# Patient Record
Sex: MALE | Race: WHITE | RURAL
[De-identification: names, ages, dates, MRNs, and addresses within clinical notes are randomized per-mention and may not be internally consistent; named-entity substitution may affect disease eponyms.]

---

## 2017-08-03 ENCOUNTER — HOSPITAL ENCOUNTER (OUTPATIENT)
Dept: OTHER | Age: 37
Discharge: HOME | End: 2017-08-03

## 2017-08-06 ENCOUNTER — OFFICE VISIT (OUTPATIENT)
Dept: RETAIL CLINIC | Facility: CLINIC | Age: 37
End: 2017-08-06

## 2017-08-06 VITALS
DIASTOLIC BLOOD PRESSURE: 60 MMHG | BODY MASS INDEX: 22.97 KG/M2 | HEIGHT: 74 IN | HEART RATE: 67 BPM | WEIGHT: 179 LBS | TEMPERATURE: 97.3 F | SYSTOLIC BLOOD PRESSURE: 108 MMHG

## 2017-08-06 DIAGNOSIS — Z11.1 VISIT FOR TB SKIN TEST: ICD-10-CM

## 2017-08-06 DIAGNOSIS — Z02.1 PHYSICAL EXAM, PRE-EMPLOYMENT: Primary | ICD-10-CM

## 2017-08-06 PROCEDURE — PEPHY: Performed by: NURSE PRACTITIONER

## 2017-08-06 PROCEDURE — 86580 TB INTRADERMAL TEST: CPT | Performed by: NURSE PRACTITIONER

## 2017-08-06 RX ORDER — LORATADINE 10 MG/1
CAPSULE, LIQUID FILLED ORAL
COMMUNITY

## 2017-08-06 NOTE — PROGRESS NOTES
Patient presents to clinic for Tb skin test. Denies having a previous positive Tb skin test. Denies signs and symptoms of Tb. See scanned documents.    TB SKIN TEST      Instructions given to return to the clinic (plans to go to [facility] due to proximity to where he lives now) within 48-72 hours to have Tb skin test read and forms completed.  Do not pick, scratch, rub or cover the injection site.  If any swelling, itching, or redness occurs contact the clinic. Patient verbalized understanding of these instructions.

## 2017-08-06 NOTE — PROGRESS NOTES
"María Dasilva is a 37 y.o. male.   Chief Complaint   Patient presents with   • Employment Physical     He is here for a physical. 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 He denies any signs/symptoms of illness. No travel out of country. No known ill exposures. He says he has been healthy.      History of Present Illness     The following portions of the patient's history were reviewed and updated as appropriate: allergies, current medications, past family history, past medical history, past social history, past surgical history and problem list.    Current Outpatient Prescriptions:   •  Loratadine (CLARITIN) 10 MG capsule, Take  by mouth., Disp: , Rfl:   •  TURMERIC PO, Take  by mouth., Disp: , Rfl:     Review of Systems   Constitutional: Negative for appetite change, chills, fatigue, fever and unexpected weight change.   HENT: Negative for congestion, hearing loss, sore throat and trouble swallowing.    Eyes: Negative for visual disturbance.   Respiratory: Negative for cough, chest tightness, shortness of breath and wheezing.    Cardiovascular: Negative for chest pain, palpitations and leg swelling.   Gastrointestinal: Negative for abdominal pain, blood in stool, constipation, diarrhea, nausea and vomiting.   Endocrine: Negative for polydipsia, polyphagia and polyuria.   Genitourinary: Negative for decreased urine volume, dysuria, flank pain and frequency.   Musculoskeletal: Negative for arthralgias, joint swelling, myalgias, neck pain and neck stiffness.   Skin: Negative for color change, pallor and rash.   Allergic/Immunologic: Negative for immunocompromised state.   Neurological: Negative for weakness, light-headedness, numbness and headaches.   Psychiatric/Behavioral: Negative for behavioral problems and sleep disturbance. The patient is not nervous/anxious.      /60  Pulse 67  Temp 97.3 °F (36.3 °C)  Ht 74\" (188 cm)  Wt 179 lb (81.2 kg)  BMI 22.98 kg/m2    Objective   Allergies "   Allergen Reactions   • Penicillins Rash   • Sulfa Antibiotics Rash       Physical Exam   Constitutional: He is oriented to person, place, and time. He appears well-developed and well-nourished.   No acute distress.    HENT:   Head: Normocephalic and atraumatic.   Right Ear: External ear normal.   Left Ear: External ear normal.   Nose: Nose normal.   Mouth/Throat: Oropharynx is clear and moist. No oropharyngeal exudate.   TM's are clear and flat.   Eyes: Conjunctivae and EOM are normal. Pupils are equal, round, and reactive to light. Right eye exhibits no discharge. Left eye exhibits no discharge. No scleral icterus.   Neck: Normal range of motion. Neck supple. No JVD present. No thyromegaly present.   Cardiovascular: Normal rate, regular rhythm and normal heart sounds.  Exam reveals no gallop and no friction rub.    No murmur heard.  Pulmonary/Chest: Effort normal and breath sounds normal. No respiratory distress. He has no wheezes. He has no rales. He exhibits no tenderness.   There is equal air entry and good air movement.    Abdominal: Soft. Bowel sounds are normal. He exhibits no distension. There is no tenderness.   There is no organomegaly or masses palpable.    Musculoskeletal: Normal range of motion.   Lymphadenopathy:     He has no cervical adenopathy.   Neurological: He is alert and oriented to person, place, and time. No cranial nerve deficit. He exhibits normal muscle tone. Coordination normal.   Skin: Skin is warm and dry. No rash noted. No erythema. No pallor.   Psychiatric: He has a normal mood and affect. His behavior is normal. Judgment and thought content normal.   Vitals reviewed.      Assessment/Plan   Felice was seen today for employment physical.    Diagnoses and all orders for this visit:    Physical exam, pre-employment      Pre employment physical completed as requested and physical form brought with him was completed (see scanned documents). He was given a Tb skin test of left  forearm and plans to have it read at [facility] in 48-72 hours due to proximity to his new job. Follow up as needed. Advised to continue to follow with a PCP for routine health screening/maintenance.

## 2017-08-07 ENCOUNTER — HOSPITAL ENCOUNTER (OUTPATIENT)
Dept: OTHER | Age: 37
Discharge: HOME | End: 2017-08-07

## 2021-01-06 ENCOUNTER — HOSPITAL ENCOUNTER (OUTPATIENT)
Facility: HOSPITAL | Age: 41
Discharge: HOME | End: 2021-01-06
Payer: COMMERCIAL

## 2021-01-06 ENCOUNTER — TELEPHONE (OUTPATIENT)
Dept: BEHAVIORAL/MENTAL HEALTH | Age: 41
End: 2021-01-06

## 2021-01-06 LAB
A/G RATIO: 2.2 (ref 0.8–2)
ALBUMIN SERPL-MCNC: 4.8 G/DL (ref 3.4–4.8)
ALP BLD-CCNC: 89 U/L (ref 25–100)
ALT SERPL-CCNC: 29 U/L (ref 4–36)
ANION GAP SERPL CALCULATED.3IONS-SCNC: 10 MMOL/L (ref 3–16)
AST SERPL-CCNC: 25 U/L (ref 8–33)
BASOPHILS ABSOLUTE: 0 K/UL (ref 0–0.1)
BASOPHILS RELATIVE PERCENT: 0.8 %
BILIRUB SERPL-MCNC: 1 MG/DL (ref 0.3–1.2)
BUN BLDV-MCNC: 14 MG/DL (ref 6–20)
CALCIUM SERPL-MCNC: 9.6 MG/DL (ref 8.5–10.5)
CHLORIDE BLD-SCNC: 104 MMOL/L (ref 98–107)
CHOLESTEROL, TOTAL: 242 MG/DL (ref 0–200)
CO2: 26 MMOL/L (ref 20–30)
CREAT SERPL-MCNC: 0.9 MG/DL (ref 0.4–1.2)
EOSINOPHILS ABSOLUTE: 0.1 K/UL (ref 0–0.4)
EOSINOPHILS RELATIVE PERCENT: 1.8 %
FOLATE: >20 NG/ML
GFR AFRICAN AMERICAN: >59
GFR NON-AFRICAN AMERICAN: >59
GLOBULIN: 2.2 G/DL
GLUCOSE BLD-MCNC: 92 MG/DL (ref 74–106)
HCT VFR BLD CALC: 50.5 % (ref 40–54)
HDLC SERPL-MCNC: 33 MG/DL (ref 40–60)
HEMOGLOBIN: 16.8 G/DL (ref 13–18)
IMMATURE GRANULOCYTES #: 0 K/UL
IMMATURE GRANULOCYTES %: 0.3 % (ref 0–5)
LDL CHOLESTEROL CALCULATED: 152 MG/DL
LYMPHOCYTES ABSOLUTE: 1.4 K/UL (ref 1.5–4)
LYMPHOCYTES RELATIVE PERCENT: 35.8 %
MCH RBC QN AUTO: 31.7 PG (ref 27–32)
MCHC RBC AUTO-ENTMCNC: 33.3 G/DL (ref 31–35)
MCV RBC AUTO: 95.3 FL (ref 80–100)
MONOCYTES ABSOLUTE: 0.5 K/UL (ref 0.2–0.8)
MONOCYTES RELATIVE PERCENT: 11.8 %
NEUTROPHILS ABSOLUTE: 2 K/UL (ref 2–7.5)
NEUTROPHILS RELATIVE PERCENT: 49.5 %
PDW BLD-RTO: 13.2 % (ref 11–16)
PLATELET # BLD: 217 K/UL (ref 150–400)
PMV BLD AUTO: 10.1 FL (ref 6–10)
POTASSIUM SERPL-SCNC: 4.4 MMOL/L (ref 3.4–5.1)
RBC # BLD: 5.3 M/UL (ref 4.5–6)
SODIUM BLD-SCNC: 140 MMOL/L (ref 136–145)
TOTAL PROTEIN: 7 G/DL (ref 6.4–8.3)
TRIGL SERPL-MCNC: 284 MG/DL (ref 0–249)
TSH SERPL DL<=0.05 MIU/L-ACNC: 1.98 UIU/ML (ref 0.27–4.2)
VITAMIN B-12: 538 PG/ML (ref 211–911)
VITAMIN D 25-HYDROXY: 27.6 (ref 32–100)
VLDLC SERPL CALC-MCNC: 57 MG/DL
WBC # BLD: 4 K/UL (ref 4–11)

## 2021-01-06 PROCEDURE — 82746 ASSAY OF FOLIC ACID SERUM: CPT

## 2021-01-06 PROCEDURE — 84443 ASSAY THYROID STIM HORMONE: CPT

## 2021-01-06 PROCEDURE — 36415 COLL VENOUS BLD VENIPUNCTURE: CPT

## 2021-01-06 PROCEDURE — 85025 COMPLETE CBC W/AUTO DIFF WBC: CPT

## 2021-01-06 PROCEDURE — 80061 LIPID PANEL: CPT

## 2021-01-06 PROCEDURE — 82607 VITAMIN B-12: CPT

## 2021-01-06 PROCEDURE — 80053 COMPREHEN METABOLIC PANEL: CPT

## 2021-01-06 PROCEDURE — 82306 VITAMIN D 25 HYDROXY: CPT

## 2021-01-06 NOTE — TELEPHONE ENCOUNTER
PCP [address] regarding patient identifying he had problems with Anxiety. As per PCP \"Lots of Deep Isues, First wife  at age 20, from sudden kidney failure. He  again, and she left him due to anxiety and depression. It has been 12 years since first wife passed. \"      [facility] reached out in response to referral and discussed scheduling with pt. 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

## 2021-01-07 ENCOUNTER — VIRTUAL VISIT (OUTPATIENT)
Dept: BEHAVIORAL/MENTAL HEALTH | Age: 41
End: 2021-01-07

## 2021-01-07 DIAGNOSIS — F41.1 GAD (GENERALIZED ANXIETY DISORDER): Primary | ICD-10-CM

## 2021-01-07 NOTE — PROGRESS NOTES
Pt is a 41year old   and  male, presenting for services. As per information regarding referral.  PCP [address] regarding patient identifying he had problems with Anxiety. As per PCP \"Lots of Deep Issues,  First wife  at age 20, from sudden kidney failure. He  again, and she left him due to anxiety and depression. It has been 12 years since first wife passed. \"  Pt identified my main issue is my anxiety, I have been dealing with it my entire life, but growing up, Mental Health was a stigma. My doctor just started me on Lexapro, and I cant tell my parents or brother. They wouldnt get it. I did tell my girlfriend, but she is understanding. When Inder Zarina told me I would be starting meds, I was in tears, just knowing I could have some kind of relief.   Pt denies feeling like he has any depression currently. Pt identified looking back now believing he had been dealing with anxiety for a long time  as a child I talked about my chest hurting a lot, I mean I wasnt bullied, it was just kids being kids, but I think it was anxiety.  P    Pt identified not really having dealt with feelings after his first two marriages. Pt talked about first wife Gita, and the events surrounding her illness and death, and the stress that came from it. Pt talked about his second marriage that came to an end as well. Pt identified being into music, and diving back in after the death of his first wife, noting that he had found some success in it, and identifying it being healing in ways as well. Pt denied having any therapy after his first wifes death. Pt met 2nd wife in , and we were  nearly 7 years. 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  Pt identified they started having issues in 2018, pt identified having a step-daughter that he raised during his marriage with Hayden. Pt identified that Hayden told me she wanted to separate, and then divorce. Looking back I think she wanted me for stability I offered, she was very type A. I know my anxiety was a part of things then, but she was remarried a few months later. Pt identified noticing problems with his consistency within his moods with people I am close with, I want distance some days, and other days Im alisa dovey. .  Pt identifies trying to always be the positive person, and putting on a face most of the time, Pt stated sometimes I have problems with focus.   Pt identified coping skills as walking, exercising, using chiropractor, and trying to take care of myself,: Pt identified some intrusive thoughts, it gets so bad sometimes, I get feelings in my chest, mainly in the morning, and feelings of doom, over simple stuff, like did I lock this door, or did I turn this off.  Pt Identified that he noticed having some nervous habits/tics like running his fingers through his hair. The ongoing anxiety has lead to pt questioning himself at what point is this interfering in my life? I am just trying to figure it out, and trying to make things better. I want to get back  to enjoying things and be able to focus and enjoy my life and be able to relax.   I get fixed on things sometimes, I get something on my mind and its hard to get it out. [de-identified]  I feel like anxiety is everyday, like a nervous energy. Pt identified that he has had a hx of B12 issues before, and takes vitamins sometimes when he or someone else feels things are off.  Pt discussed some Generalized anxiety, when I am with my girlfriend I start having the intrusive thoughts about leaving the stove on at my house, or something else.   Pt also identified some anger and aggression, that if I have a bad day,  I have noticed some transference, I turn emotions/anger, onto things or people, I come home and can be hateful, and I remember after my first wifes death, my car kept dying, and it was all this pent up anger I think, I actually got my gun, and I was going to shoot the car, but I decided to sell it.        O:  MSE:  Appearance: Not Evaluated, as session was over phone. Attitude: \"cooperative\",\"friendly\",  Consciousness: \"alert\"  Orientation: \"oriented to person, place, time, general circumstance\"  Memory: \"recent and remote memory intact\"  Attention/Concentration: \"intact during session\"  Psychomotor Activity:\" Not Evaluated, as session was over phone. Eye Contact: Not Evaluated, as session was over phone. Speech: \"normal rate and volume, well-articulated\",  Mood: \"anxious  Affect: \"congruent with thought content and mood\"  Perception: \"within normal limits\",\"  Thought Content: \"within normal limits\"  Thought Process: \"logical, goal-directed, coherent\"  Insight: \"good  Judgment: \"not assessed\"  Morbid ideation: not identified  Suicide Assessment: Pt denied. History:    Medications:   No current outpatient medications on file. No current facility-administered medications for this visit.         Social History:   Social History     Socioeconomic History    Marital status:      Spouse name: Not on file    Number of children: Not on file    Years of education: Not on file    Highest education level: Not on file   Occupational History    Not on file   Social Needs    Financial resource strain: Not on file    Food insecurity     Worry: Not on file     Inability: Not on file    Transportation needs     Medical: Not on file     Non-medical: Not on file   Tobacco Use    Smoking status: Not on file   Substance and Sexual Activity    Alcohol use: Not on file    Drug use: Not on file    Sexual activity: Not on file   Lifestyle    Physical activity     Days per week: Not on file     Minutes per session: Not on file    Stress: Not on file   Relationships    Social connections [facility] provided psychoeducation, including information about Coping Skills, as well as specific ones for anxiety and depression. Prompted discussion of current utilized coping skills. Provided psychoeducation about physical and mental health being connected and the importance of pt taking care of himself, Discussed sensory coping skills, providing psychoeducation on importance of skills that can be utilized when Pt is at work, or in a different environment. [facility] provided psychoeducation, via email (see below), focused on cognitive distortions, and how to work on challenging these, as well as challenging automatic negative thoughts and changing internal responses to this. Discussed radical acceptance, and distress tolerance. Discussion of longer term therapeutic options, involving outpatient therapy including telehealth options that were available. Pt identified that she felt she would benefit from longer term traditional outpatient counseling, and possibly medication management. [facility] Provided with contact information for [facility], As well as 24 hour crisis line. Pt Behavioral Change Plan:    ? Safety plan identified. Provided with 24 hour crisis number to use if symptoms intensify. ? Also provided with Provided with contact information for [facility] if any questions or problems arose. ? Provided with psychoeducation, via email about coping skills, cognitive distortions, challenging cognitive distortions, coping with anxiety,  sensory coping, deep breathing, and progressive muscle relaxation. ? [facility] examined options for ongoing treatment based on [employer], [facility] provided information on [facility] in [place]. As well as [facility], the P[facility] of [facility]. [facility] explained how they had changed their methods for referral, and were requesting direct requests from Patients to schedule appts. ? Pt to reach out to selected agency for counseling. ? Pt to continue with PCP for Medication Management, and to monitor changes in mood and anxiety with introduction of this new medication into system. ? [facility] provided pt with contact information. Pt to contact [facility] back if he had any questions, or needed to talk before he could get an appointment.          Electronically signed by Skylar Smith LCSW on 1/11/2021 at 5:07 PM\

## 2021-01-08 ASSESSMENT — ANXIETY QUESTIONNAIRES
1. FEELING NERVOUS, ANXIOUS, OR ON EDGE: 3-NEARLY EVERY DAY
2. NOT BEING ABLE TO STOP OR CONTROL WORRYING: 2-OVER HALF THE DAYS
7. FEELING AFRAID AS IF SOMETHING AWFUL MIGHT HAPPEN: 0-NOT AT ALL
3. WORRYING TOO MUCH ABOUT DIFFERENT THINGS: 3-NEARLY EVERY DAY
5. BEING SO RESTLESS THAT IT IS HARD TO SIT STILL: 3-NEARLY EVERY DAY

## 2021-09-27 DIAGNOSIS — Z31.9 INFERTILITY MANAGEMENT: Primary | ICD-10-CM

## 2021-10-08 ENCOUNTER — LAB (OUTPATIENT)
Dept: LAB | Facility: HOSPITAL | Age: 41
End: 2021-10-08

## 2021-10-08 DIAGNOSIS — Z31.9 INFERTILITY MANAGEMENT: ICD-10-CM

## 2021-10-08 DIAGNOSIS — Z31.9 ENCOUNTER FOR PROCREATIVE MANAGEMENT, UNSPECIFIED: Primary | ICD-10-CM

## 2021-10-08 PROCEDURE — 89320 SEMEN ANAL VOL/COUNT/MOT: CPT | Performed by: OBSTETRICS & GYNECOLOGY

## 2021-10-10 LAB
CHARACTER SMN: NORMAL
COLOR SMN: NORMAL
LIQUEFACTION TIME SMN: NORMAL MIN
PH SMN: 8 [PH]
SPECIMEN VOL SMN: 2.5 ML (ref 2–5)
SPERM # SMN: 95.2 MILLIONS/ML
SPERM MORPHOLOGY COMMENT: NORMAL
SPERM MOTILE NFR SMN: 80 % MOTILE (ref 50–100)
VISC SMN: NORMAL CP

## 2022-08-01 ENCOUNTER — HOSPITAL ENCOUNTER (OUTPATIENT)
Facility: HOSPITAL | Age: 42
Discharge: HOME | End: 2022-08-01
Payer: COMMERCIAL

## 2022-08-01 LAB
A/G RATIO: 2.4 (ref 0.8–2)
ALBUMIN SERPL-MCNC: 4.7 G/DL (ref 3.4–4.8)
ALP BLD-CCNC: 96 U/L (ref 25–100)
ALT SERPL-CCNC: 22 U/L (ref 4–36)
ANION GAP SERPL CALCULATED.3IONS-SCNC: 10 MMOL/L (ref 3–16)
AST SERPL-CCNC: 22 U/L (ref 8–33)
BASOPHILS ABSOLUTE: 0 K/UL (ref 0–0.1)
BASOPHILS RELATIVE PERCENT: 0.8 %
BILIRUB SERPL-MCNC: 0.8 MG/DL (ref 0.3–1.2)
BUN BLDV-MCNC: 12 MG/DL (ref 6–20)
CALCIUM SERPL-MCNC: 9.4 MG/DL (ref 8.5–10.5)
CHLORIDE BLD-SCNC: 104 MMOL/L (ref 98–107)
CHOLESTEROL, TOTAL: 268 MG/DL (ref 0–200)
CO2: 25 MMOL/L (ref 20–30)
CREAT SERPL-MCNC: 1 MG/DL (ref 0.4–1.2)
EOSINOPHILS ABSOLUTE: 0.1 K/UL (ref 0–0.4)
EOSINOPHILS RELATIVE PERCENT: 1.8 %
FOLATE: 14.65 NG/ML
GFR AFRICAN AMERICAN: >59
GFR NON-AFRICAN AMERICAN: >59
GLOBULIN: 2 G/DL
GLUCOSE BLD-MCNC: 95 MG/DL (ref 74–106)
HCT VFR BLD CALC: 46 % (ref 40–54)
HDLC SERPL-MCNC: 32 MG/DL (ref 40–60)
HEMOGLOBIN: 15.7 G/DL (ref 13–18)
IMMATURE GRANULOCYTES #: 0 K/UL
IMMATURE GRANULOCYTES %: 0.6 % (ref 0–5)
LDL CHOLESTEROL CALCULATED: ABNORMAL MG/DL
LDL CHOLESTEROL DIRECT: 155 MG/DL
LYMPHOCYTES ABSOLUTE: 1.5 K/UL (ref 1.5–4)
LYMPHOCYTES RELATIVE PERCENT: 30.4 %
MCH RBC QN AUTO: 31.3 PG (ref 27–32)
MCHC RBC AUTO-ENTMCNC: 34.1 G/DL (ref 31–35)
MCV RBC AUTO: 91.6 FL (ref 80–100)
MONOCYTES ABSOLUTE: 0.5 K/UL (ref 0.2–0.8)
MONOCYTES RELATIVE PERCENT: 9.2 %
NEUTROPHILS ABSOLUTE: 2.8 K/UL (ref 2–7.5)
NEUTROPHILS RELATIVE PERCENT: 57.2 %
PDW BLD-RTO: 13.3 % (ref 11–16)
PLATELET # BLD: 189 K/UL (ref 150–400)
PMV BLD AUTO: 9.6 FL (ref 6–10)
POTASSIUM SERPL-SCNC: 4.6 MMOL/L (ref 3.4–5.1)
RBC # BLD: 5.02 M/UL (ref 4.5–6)
SODIUM BLD-SCNC: 139 MMOL/L (ref 136–145)
TOTAL PROTEIN: 6.7 G/DL (ref 6.4–8.3)
TRIGL SERPL-MCNC: 549 MG/DL (ref 0–249)
TSH SERPL DL<=0.05 MIU/L-ACNC: 2.68 UIU/ML (ref 0.27–4.2)
VITAMIN B-12: 394 PG/ML (ref 211–911)
VITAMIN D 25-HYDROXY: 24.8 (ref 32–100)
VLDLC SERPL CALC-MCNC: ABNORMAL MG/DL
WBC # BLD: 4.9 K/UL (ref 4–11)

## 2022-08-01 PROCEDURE — 82746 ASSAY OF FOLIC ACID SERUM: CPT

## 2022-08-01 PROCEDURE — 80053 COMPREHEN METABOLIC PANEL: CPT

## 2022-08-01 PROCEDURE — 84443 ASSAY THYROID STIM HORMONE: CPT

## 2022-08-01 PROCEDURE — 80061 LIPID PANEL: CPT

## 2022-08-01 PROCEDURE — 36415 COLL VENOUS BLD VENIPUNCTURE: CPT

## 2022-08-01 PROCEDURE — 85025 COMPLETE CBC W/AUTO DIFF WBC: CPT

## 2022-08-01 PROCEDURE — 82607 VITAMIN B-12: CPT

## 2022-08-01 PROCEDURE — 82306 VITAMIN D 25 HYDROXY: CPT

## 2024-02-22 ENCOUNTER — OFFICE VISIT (OUTPATIENT)
Dept: INTERNAL MEDICINE | Facility: CLINIC | Age: 44
End: 2024-02-22
Payer: COMMERCIAL

## 2024-02-22 ENCOUNTER — TELEPHONE (OUTPATIENT)
Dept: INTERNAL MEDICINE | Facility: CLINIC | Age: 44
End: 2024-02-22

## 2024-02-22 VITALS
TEMPERATURE: 98 F | SYSTOLIC BLOOD PRESSURE: 122 MMHG | WEIGHT: 201 LBS | HEIGHT: 74 IN | HEART RATE: 78 BPM | BODY MASS INDEX: 25.8 KG/M2 | OXYGEN SATURATION: 98 % | DIASTOLIC BLOOD PRESSURE: 82 MMHG

## 2024-02-22 DIAGNOSIS — Z30.09 VASECTOMY EVALUATION: ICD-10-CM

## 2024-02-22 DIAGNOSIS — Z00.00 WELL ADULT EXAM: Primary | ICD-10-CM

## 2024-02-22 DIAGNOSIS — F41.9 ANXIETY: ICD-10-CM

## 2024-02-22 DIAGNOSIS — E78.5 HYPERLIPIDEMIA, UNSPECIFIED HYPERLIPIDEMIA TYPE: ICD-10-CM

## 2024-02-22 DIAGNOSIS — Z12.11 ENCOUNTER FOR SCREENING COLONOSCOPY: ICD-10-CM

## 2024-02-22 DIAGNOSIS — Z13.0 SCREENING FOR BLOOD DISEASE: ICD-10-CM

## 2024-02-22 DIAGNOSIS — Z13.29 SCREENING FOR ENDOCRINE DISORDER: ICD-10-CM

## 2024-02-22 LAB
ALBUMIN SERPL-MCNC: 4.9 G/DL (ref 3.5–5.2)
ALBUMIN/GLOB SERPL: 2.1 G/DL
ALP SERPL-CCNC: 94 U/L (ref 39–117)
ALT SERPL-CCNC: 18 U/L (ref 1–41)
AST SERPL-CCNC: 21 U/L (ref 1–40)
BASOPHILS # BLD AUTO: 0.03 10*3/MM3 (ref 0–0.2)
BASOPHILS NFR BLD AUTO: 0.7 % (ref 0–1.5)
BILIRUB SERPL-MCNC: 0.8 MG/DL (ref 0–1.2)
BUN SERPL-MCNC: 15 MG/DL (ref 6–20)
BUN/CREAT SERPL: 17.9 (ref 7–25)
CALCIUM SERPL-MCNC: 9.6 MG/DL (ref 8.6–10.5)
CHLORIDE SERPL-SCNC: 101 MMOL/L (ref 98–107)
CHOLEST SERPL-MCNC: 248 MG/DL (ref 0–200)
CO2 SERPL-SCNC: 24.2 MMOL/L (ref 22–29)
CREAT SERPL-MCNC: 0.84 MG/DL (ref 0.76–1.27)
EGFRCR SERPLBLD CKD-EPI 2021: 109.6 ML/MIN/1.73
EOSINOPHIL # BLD AUTO: 0.1 10*3/MM3 (ref 0–0.4)
EOSINOPHIL NFR BLD AUTO: 2.2 % (ref 0.3–6.2)
ERYTHROCYTE [DISTWIDTH] IN BLOOD BY AUTOMATED COUNT: 13.7 % (ref 12.3–15.4)
GLOBULIN SER CALC-MCNC: 2.3 GM/DL
GLUCOSE SERPL-MCNC: 90 MG/DL (ref 65–99)
HCT VFR BLD AUTO: 47.7 % (ref 37.5–51)
HDLC SERPL-MCNC: 33 MG/DL (ref 40–60)
HGB BLD-MCNC: 16.1 G/DL (ref 13–17.7)
IMM GRANULOCYTES # BLD AUTO: 0.03 10*3/MM3 (ref 0–0.05)
IMM GRANULOCYTES NFR BLD AUTO: 0.7 % (ref 0–0.5)
LDLC SERPL CALC-MCNC: 150 MG/DL (ref 0–100)
LYMPHOCYTES # BLD AUTO: 1.47 10*3/MM3 (ref 0.7–3.1)
LYMPHOCYTES NFR BLD AUTO: 32.2 % (ref 19.6–45.3)
MCH RBC QN AUTO: 31.6 PG (ref 26.6–33)
MCHC RBC AUTO-ENTMCNC: 33.8 G/DL (ref 31.5–35.7)
MCV RBC AUTO: 93.5 FL (ref 79–97)
MONOCYTES # BLD AUTO: 0.37 10*3/MM3 (ref 0.1–0.9)
MONOCYTES NFR BLD AUTO: 8.1 % (ref 5–12)
NEUTROPHILS # BLD AUTO: 2.56 10*3/MM3 (ref 1.7–7)
NEUTROPHILS NFR BLD AUTO: 56.1 % (ref 42.7–76)
NRBC BLD AUTO-RTO: 0 /100 WBC (ref 0–0.2)
PLATELET # BLD AUTO: 224 10*3/MM3 (ref 140–450)
POTASSIUM SERPL-SCNC: 4.5 MMOL/L (ref 3.5–5.2)
PROT SERPL-MCNC: 7.2 G/DL (ref 6–8.5)
RBC # BLD AUTO: 5.1 10*6/MM3 (ref 4.14–5.8)
SODIUM SERPL-SCNC: 136 MMOL/L (ref 136–145)
TRIGL SERPL-MCNC: 347 MG/DL (ref 0–150)
VLDLC SERPL CALC-MCNC: 65 MG/DL (ref 5–40)
WBC # BLD AUTO: 4.56 10*3/MM3 (ref 3.4–10.8)

## 2024-02-22 RX ORDER — BUPROPION HYDROCHLORIDE 150 MG/1
150 TABLET ORAL DAILY
Qty: 30 TABLET | Refills: 5 | Status: SHIPPED | OUTPATIENT
Start: 2024-02-22 | End: 2024-02-23 | Stop reason: SDUPTHER

## 2024-02-22 RX ORDER — BUPROPION HYDROCHLORIDE 150 MG/1
150 TABLET ORAL DAILY
Qty: 30 TABLET | Refills: 5 | Status: SHIPPED | OUTPATIENT
Start: 2024-02-22 | End: 2024-02-22 | Stop reason: SDUPTHER

## 2024-02-22 NOTE — ASSESSMENT & PLAN NOTE
I discussed with the patient routine health maintenance including vaccines, Dental/eye health,  healthy diet and exercise, colorectal cancer screening. Mental health has been addressed today as well. Routine labs have been ordered.

## 2024-02-22 NOTE — PROGRESS NOTES
Felice Dasilva is a 43 y.o. male.    Chief Complaint   Patient presents with    Annual Exam       HPI     Felice Dasilav is a 43-year-old male who presents today to establish care and for an annual physical exam.     He tries not to eat sweets as it makes him more tired. He drinks soda a couple of times a month. Patient states he tries to stay fairly active. He tries to get 10,000 steps a day. He is up-to-date on his dental exams and eye exams. He has not had a colonoscopy yet and would like a referral for vasectomy. He denies any surgeries and confirms he had his wisdom teeth removed.Patient confirms he has had 3 COVID-19 vaccines and does not usually receive vaccination for influenza. He has elected to receive vaccination for tetanus today.    He has had anxiety since he was a child and his chest would hurt when he was a kid. He admits to increased stress here lately.  He is taking lexapro.  Patient reports he has taken Lexapro 20 mg for anxiety since  he adds he started having alopecia and hair loss and notes his father  in a tracor accident in 2023 and since then the Lexapro has not been working as well. He adds it is hurting his libido more. He has tried to wean himself off the Lexapro and it was good for a week, but then his symptoms came back.     He was on atorvastatin for a few months in the past, but it was causing his blood sugar to get up and down, so he stopped taking it. His previous doctor suggested fish oil. He has been taking it for 20 years. He takes over-the-counter fish oil, a multivitamin, and B12.      The following portions of the patient's history were reviewed and updated as appropriate: allergies, current medications, past family history, past medical history, past social history, past surgical history and problem list.     Past Medical History:   Diagnosis Date    Allergic rhinitis     Anxiety     Hyperlipidemia        Past Surgical History:   Procedure Laterality Date     WISDOM TOOTH EXTRACTION         Family History   Problem Relation Age of Onset    High cholesterol Mother     Dementia Mother     High cholesterol Father     Cancer Maternal Grandmother         melanoma    Diabetes Maternal Grandfather     Stroke Paternal Grandmother        Social History     Socioeconomic History    Marital status:    Tobacco Use    Smoking status: Never     Passive exposure: Never    Smokeless tobacco: Never   Vaping Use    Vaping Use: Never used   Substance and Sexual Activity    Alcohol use: Yes     Alcohol/week: 1.0 standard drink of alcohol     Types: 1 Cans of beer per week     Comment: Occasional beer once weekly socially    Drug use: No    Sexual activity: Yes     Partners: Female     Birth control/protection: None       Allergies   Allergen Reactions    Penicillins Rash     Pt can tolerate       Sulfa Antibiotics Rash     Pt can tolerate            Current Outpatient Medications:     buPROPion XL (Wellbutrin XL) 150 MG 24 hr tablet, Take 1 tablet by mouth Daily., Disp: 30 tablet, Rfl: 5    escitalopram (LEXAPRO) 20 MG tablet, Take 1 tablet by mouth every night at bedtime., Disp: , Rfl:     Multiple Vitamin (MULTI-VITAMIN DAILY PO), Take  by mouth., Disp: , Rfl:     Omega-3 Fatty Acids (FISH OIL PO), Take  by mouth., Disp: , Rfl:     ROS    Review of Systems   Constitutional:  Positive for fatigue. Negative for chills and fever.   HENT:  Negative for congestion and rhinorrhea.    Eyes:  Negative for blurred vision, itching and visual disturbance.   Respiratory:  Negative for cough and shortness of breath.    Cardiovascular:  Negative for chest pain.   Gastrointestinal:  Negative for abdominal pain, constipation, diarrhea, nausea and vomiting.   Genitourinary:  Negative for dysuria and frequency.   Musculoskeletal:  Negative for arthralgias, back pain and myalgias.   Skin:  Negative for color change and rash.   Neurological:  Negative for weakness, numbness and headache.  "  Hematological:  Does not bruise/bleed easily.   Psychiatric/Behavioral:  Negative for depressed mood. The patient is nervous/anxious.        Vitals:    02/22/24 0905   BP: 122/82   BP Location: Right arm   Patient Position: Sitting   Cuff Size: Adult   Pulse: 78   Temp: 98 °F (36.7 °C)   SpO2: 98%   Weight: 91.2 kg (201 lb)   Height: 188 cm (74\")   PainSc: 0-No pain     Body mass index is 25.81 kg/m².    Physical Exam     Physical Exam  Constitutional:       General: He is not in acute distress.     Appearance: Normal appearance. He is well-developed.   HENT:      Head: Normocephalic and atraumatic.      Right Ear: Tympanic membrane and external ear normal.      Left Ear: Tympanic membrane and external ear normal.      Mouth/Throat:      Pharynx: No posterior oropharyngeal erythema.   Eyes:      Extraocular Movements: Extraocular movements intact.      Conjunctiva/sclera: Conjunctivae normal.      Pupils: Pupils are equal, round, and reactive to light.   Cardiovascular:      Rate and Rhythm: Normal rate and regular rhythm.      Heart sounds: No murmur heard.  Pulmonary:      Effort: Pulmonary effort is normal. No respiratory distress.      Breath sounds: Normal breath sounds. No wheezing.   Abdominal:      General: Bowel sounds are normal. There is no distension.      Palpations: Abdomen is soft.      Tenderness: There is no abdominal tenderness.   Musculoskeletal:      Cervical back: Neck supple.      Right lower leg: No edema.      Left lower leg: No edema.   Lymphadenopathy:      Cervical: No cervical adenopathy.   Skin:     General: Skin is warm and dry.   Neurological:      Mental Status: He is alert and oriented to person, place, and time.      Cranial Nerves: No cranial nerve deficit.      Deep Tendon Reflexes: Reflexes normal.   Psychiatric:         Mood and Affect: Mood normal.         Behavior: Behavior normal.         Assessment/Plan    Diagnoses and all orders for this visit:    1. Well adult exam " (Primary)  Assessment & Plan:  I discussed with the patient routine health maintenance including vaccines, Dental/eye health,  healthy diet and exercise, colorectal cancer screening. Mental health has been addressed today as well. Routine labs have been ordered.        2. Anxiety  Assessment & Plan:  It is uncontrolled with Lexapro alone. I will add in Wellbutrin. We discussed potential side effects of the medication.      3. Hyperlipidemia, unspecified hyperlipidemia type  -     Lipid Panel    4. Encounter for screening colonoscopy  -     Ambulatory Referral For Screening Colonoscopy    5. Vasectomy evaluation  -     Ambulatory Referral to Urology    6. Screening for endocrine disorder  -     Comprehensive Metabolic Panel    7. Screening for blood disease  -     CBC & Differential    Other orders  -     Tdap Vaccine Greater Than or Equal To 6yo IM  -     Discontinue: buPROPion XL (Wellbutrin XL) 150 MG 24 hr tablet; Take 1 tablet by mouth Daily.  Dispense: 30 tablet; Refill: 5  -     buPROPion XL (Wellbutrin XL) 150 MG 24 hr tablet; Take 1 tablet by mouth Daily.  Dispense: 30 tablet; Refill: 5        New Medications Ordered This Visit   Medications    buPROPion XL (Wellbutrin XL) 150 MG 24 hr tablet     Sig: Take 1 tablet by mouth Daily.     Dispense:  30 tablet     Refill:  5       Orders Placed This Encounter   Procedures    Tdap Vaccine Greater Than or Equal To 6yo IM       Return in about 3 months (around 5/22/2024) for anxiety.      Zhanna Hurley DO    Transcribed from ambient dictation for Zhanna Hurley DO by Mahesh Aggarwal.  02/22/24   11:15 EST    Patient or patient representative verbalized consent to the visit recording.  I have personally performed the services described in this document as transcribed by the above individual, and it is both accurate and complete.  Zhanna Hurley DO  2/22/2024  17:13 EST

## 2024-02-22 NOTE — LETTER
[facility]  Vaccine Consent Form    Patient Name:  Felice Dasilva  Patient :  1980     Vaccine(s) Ordered    Tdap Vaccine Greater Than or Equal To 6yo IM        Screening Checklist  The following questions should be completed prior to vaccination. If you answer “yes” to any question, it does not necessarily mean you should not be vaccinated. It just means we may need to clarify or ask more questions. If a question is unclear, please ask your healthcare provider to explain it.    Yes No   Any fever or moderate to severe illness today (mild illness and/or antibiotic treatment are not contraindications)?     Do you have a history of a serious reaction to any previous vaccinations, such as anaphylaxis, encephalopathy within 7 days, Guillain-Sharon syndrome within 6 weeks, seizure?     Have you received any live vaccine(s) (e.g MMR, DORA) or any other vaccines in the last month (to ensure duplicate doses aren't given)?     Do you have an anaphylactic allergy to latex (DTaP, DTaP-IPV, Hep A, Hep B, MenB, RV, Td, Tdap), baker’s yeast (Hep B, HPV), polysorbates (RSV, nirsevimab, PCV 20, Rotavirrus, Tdap, Shingrix), or gelatin (DORA, MMR)?     Do you have an anaphylactic allergy to neomycin (Rabies, DORA, MMR, IPV, Hep A), polymyxin B (IPV), or streptomycin (IPV)?      Any cancer, leukemia, AIDS, or other immune system disorder? (DORA, MMR, RV)     Do you have a parent, brother, or sister with an immune system problem (if immune competence of vaccine recipient clinically verified, can proceed)? (MMR, DORA)     Any recent steroid treatments for >2 weeks, chemotherapy, or radiation treatment? (DORA, MMR)     Have you received antibody-containing blood transfusions or IVIG in the past 11 months (recommended interval is dependent on product)? (MMR, DORA)     Have you taken antiviral drugs (acyclovir, famciclovir, valacyclovir for DORA) in the last 24 or 48 hours, respectively?      Are you pregnant or planning to become  "pregnant within 1 month? (DORA, MMR, HPV, IPV, MenB, Abrexvy; For Hep B- refer to Engerix-B; For RSV - Abrysvo is indicated for 32-36 weeks of pregnancy from September to January)     For infants, have you ever been told your child has had intussusception or a medical emergency involving obstruction of the intestine (Rotavirus)? If not for an infant, can skip this question.         *Ordering Physicians/APC should be consulted if \"yes\" is checked by the patient or guardian above.  I have received, read, and understand the Vaccine Information Statement (VIS) for each vaccine ordered.  I have considered my or my child's health status as well as the health status of my close contacts.  I have taken the opportunity to discuss my vaccine questions with my or my child's health care provider.   I have requested that the ordered vaccine(s) be given to me or my child.  I understand the benefits and risks of the vaccines.  I understand that I should remain in the clinic for 15 minutes after receiving the vaccine(s).  _________________________________________________________  Signature of Patient or Parent/Legal Guardian ____________________  Date     "

## 2024-02-22 NOTE — ASSESSMENT & PLAN NOTE
It is uncontrolled with Lexapro alone. I will add in Wellbutrin. We discussed potential side effects of the medication.

## 2024-02-22 NOTE — TELEPHONE ENCOUNTER
Caller: Felice Dasilva    Relationship: Self  Best call back number: [phone]    What medication are you requesting:   buPROPion XL (Wellbutrin XL) 150 MG 24 hr tablet    What are your current symptoms: MET WITH DR BETANCOURT TODAY    Have you had these symptoms before:    [x] Yes  [] No    Have you been treated for these symptoms before:   [x] Yes  [] No    If a prescription is needed, what is your preferred pharmacy and phone number: [id number] - [place], [place] - [address] - [phone]  - [phone] FX

## 2024-02-23 RX ORDER — BUPROPION HYDROCHLORIDE 150 MG/1
150 TABLET ORAL DAILY
Qty: 30 TABLET | Refills: 5 | Status: SHIPPED | OUTPATIENT
Start: 2024-02-23

## 2024-02-28 ENCOUNTER — TELEPHONE (OUTPATIENT)
Dept: SURGERY | Facility: CLINIC | Age: 44
End: 2024-02-28
Payer: COMMERCIAL

## 2024-02-28 NOTE — TELEPHONE ENCOUNTER
Left VM for pt regarding a colonoscopy referral we received from his provider Zhanna Hurley DO. Will try again to reach pt.

## 2024-03-04 RX ORDER — BISACODYL 5 MG/1
TABLET, DELAYED RELEASE ORAL
Qty: 4 TABLET | Refills: 0 | Status: SHIPPED | OUTPATIENT
Start: 2024-03-04

## 2024-03-04 RX ORDER — POLYETHYLENE GLYCOL 3350 17 G/17G
POWDER, FOR SOLUTION ORAL
Qty: 238 G | Refills: 0 | Status: SHIPPED | OUTPATIENT
Start: 2024-03-04

## 2024-03-04 NOTE — TELEPHONE ENCOUNTER
Pt would like to be scheduled at [facility] on 06/18 w/ Dr. Johnson-verified pharmacy. Thank you.

## 2024-03-04 NOTE — TELEPHONE ENCOUNTER
PRESCREENING FOR OPEN ACCESS SCHEDULING    Felice Dasilva, 1980  [id number]    03/04/24    If, the patient answers yes to any of the following questions the provider will be informed prior to scheduling open access for approval and documented in the chart.    []  Yes  [x] No    1. Have you ever had a colonoscopy in the past?      When:        Where:       Polyps or other:     []  Yes  [x] No    2. Family history of colon cancer?      Relation:       Age of onset:       Do you currently have any of the following?    []  Yes  [x] No  Rectal bleeding, if so, how long?     []  Yes  [x] No  Abdominal pain, if so, how long?    []  Yes  [x] No  Constipation, if so, how long?    []  Yes  [x] No  Diarrhea, if so, how long?    []  Yes  [x] No  Weight loss, is so, how much?    [] Yes  [x] No  Small caliber stool, if so, how long?      Have you ever had any of the following conditions?    [] Yes  [x] No  Heart attack?      When?       Last cardiac workup?     Blood thinners?    [] Yes  [x] No   Lung problems, asthma or COPD?  [] Yes  [x] No  Oxygen required?       [] Yes  [x] No  Stroke?     [] Yes  [x] No  Have you ever had a reaction to anesthesia?

## 2024-03-05 ENCOUNTER — PREP FOR SURGERY (OUTPATIENT)
Dept: OTHER | Facility: HOSPITAL | Age: 44
End: 2024-03-05
Payer: COMMERCIAL

## 2024-03-05 DIAGNOSIS — Z12.11 SCREENING FOR COLON CANCER: Primary | ICD-10-CM

## 2024-03-06 ENCOUNTER — PREP FOR SURGERY (OUTPATIENT)
Dept: OTHER | Facility: HOSPITAL | Age: 44
End: 2024-03-06
Payer: COMMERCIAL

## 2024-03-06 DIAGNOSIS — Z12.11 SCREENING FOR COLON CANCER: Primary | ICD-10-CM

## 2024-03-07 RX ORDER — FENOFIBRATE 145 MG/1
145 TABLET, COATED ORAL DAILY
Qty: 90 TABLET | Refills: 3 | Status: SHIPPED | OUTPATIENT
Start: 2024-03-07

## 2024-05-17 ENCOUNTER — TELEPHONE (OUTPATIENT)
Dept: SURGERY | Facility: CLINIC | Age: 44
End: 2024-05-17
Payer: COMMERCIAL

## 2024-05-20 NOTE — TELEPHONE ENCOUNTER
MO RESCHEDULED FOR 06/28/2024 @ [facility]. [facility] WAS NOTIFIED, CASE REQUEST WAS SENT

## 2024-05-31 ENCOUNTER — OFFICE VISIT (OUTPATIENT)
Dept: INTERNAL MEDICINE | Facility: CLINIC | Age: 44
End: 2024-05-31
Payer: COMMERCIAL

## 2024-05-31 VITALS
TEMPERATURE: 98 F | BODY MASS INDEX: 25.67 KG/M2 | HEIGHT: 74 IN | HEART RATE: 72 BPM | DIASTOLIC BLOOD PRESSURE: 82 MMHG | SYSTOLIC BLOOD PRESSURE: 124 MMHG | WEIGHT: 200 LBS | OXYGEN SATURATION: 99 %

## 2024-05-31 DIAGNOSIS — E78.5 HYPERLIPIDEMIA, UNSPECIFIED HYPERLIPIDEMIA TYPE: ICD-10-CM

## 2024-05-31 DIAGNOSIS — F41.9 ANXIETY: Primary | ICD-10-CM

## 2024-05-31 DIAGNOSIS — K42.9 UMBILICAL HERNIA WITHOUT OBSTRUCTION AND WITHOUT GANGRENE: ICD-10-CM

## 2024-05-31 PROCEDURE — 99214 OFFICE O/P EST MOD 30 MIN: CPT | Performed by: FAMILY MEDICINE

## 2024-05-31 RX ORDER — BUPROPION HYDROCHLORIDE 150 MG/1
150 TABLET ORAL DAILY
Qty: 90 TABLET | Refills: 3 | Status: SHIPPED | OUTPATIENT
Start: 2024-05-31

## 2024-05-31 RX ORDER — ESCITALOPRAM OXALATE 10 MG/1
10 TABLET ORAL
Qty: 30 TABLET | Refills: 0 | Status: SHIPPED | OUTPATIENT
Start: 2024-05-31

## 2024-05-31 NOTE — ASSESSMENT & PLAN NOTE
His condition has improved with Wellbutrin. He will continue with current medication. However, he will gradually taper off of Lexapro. He was advised to take Lexapro 10 mg tablet daily for a period of 2 weeks, followed by a reduction to 5 mg daily for 2 weeks, and then discontinue. However, the patient has been advised that if he should develop increased anxiety, he may restart Lexapro.

## 2024-05-31 NOTE — ASSESSMENT & PLAN NOTE
Umbilical hernia repair and recovery were discussed. The patient will notify me if he wishes to proceed with general surgery referral.

## 2024-05-31 NOTE — PROGRESS NOTES
"Felice Dasilva is a 44 y.o. male.    Chief Complaint   Patient presents with    Anxiety    belly button     Thinks he may have a hernia.        HPI     The patient is a 44-year-old male who presents today to follow up on anxiety and for his suspicion of umbilical hernia.    The patient's anxiety has been well managed. He plans to gradually reduce his Lexapro dosage, which is currently 20 mg daily. He states that his wife has observed him having a slight flat affect. He has observed a significant difference in his condition with the use of Wellbutrin, which does not induce agitation. He denies frequent feelings of nervousness or worry since starting medication. The patient denies sadness or depression. His wife has noticed he is slightly \"distant.\" A previous attempt to reduce his Lexapro dosage while not yet taking Wellbutrin yielded unsuccessful results.    The patient suspects he has an umbilical hernia, which he first noticed approximately 2 weeks ago. He is uncertain when this occurred though suspects lifting may have induced the umbilical hernia. He denies that the umbilical hernia is painful. He expresses concern about his ability to lift his  for several weeks postoperatively.     The patient denies chest pain, dyspnea, nausea, emesis, diarrhea, constipation, cough, congestion, rhinorrhea, ocular pruritus, and epiphora.    The patient has hyperlipidemia. He has been compliant with taking TriCor. He inquires about when he should complete his next lipid panel.     The following portions of the patient's history were reviewed and updated as appropriate: allergies, current medications, past family history, past medical history, past social history, past surgical history and problem list.     Allergies   Allergen Reactions    Penicillins Rash     Pt can tolerate       Sulfa Antibiotics Rash     Pt can tolerate            Current Outpatient Medications:     buPROPion XL (Wellbutrin XL) 150 MG 24 hr " "tablet, Take 1 tablet by mouth Daily., Disp: 90 tablet, Rfl: 3    escitalopram (LEXAPRO) 10 MG tablet, Take 1 tablet by mouth every night at bedtime., Disp: 30 tablet, Rfl: 0    fenofibrate (Tricor) 145 MG tablet, Take 1 tablet by mouth Daily., Disp: 90 tablet, Rfl: 3    Multiple Vitamin (MULTI-VITAMIN DAILY PO), Take  by mouth., Disp: , Rfl:     bisacodyl 5 MG EC tablet, Take 4 tablets at 1:00pm with 8oz of clear liquids the day before your colonoscopy. (Patient not taking: Reported on 5/31/2024), Disp: 4 tablet, Rfl: 0    polyethylene glycol (MIRALAX) 17 GM/SCOOP powder, Mix 238g powder with 64 oz of clear liquid at 4:00pm. Drink 8 oz every 10-15 minutes until consumed. (Patient not taking: Reported on 5/31/2024), Disp: 238 g, Rfl: 0    ROS    Review of Systems   HENT:  Positive for tinnitus. Negative for congestion, postnasal drip, rhinorrhea and sore throat.    Eyes:  Negative for blurred vision, itching and visual disturbance.        Negative for epiphora.   Respiratory:  Negative for cough, shortness of breath and wheezing.    Cardiovascular:  Negative for chest pain and leg swelling.   Gastrointestinal:  Negative for abdominal pain, constipation, diarrhea, nausea and vomiting.   Allergic/Immunologic: Positive for environmental allergies.   Psychiatric/Behavioral:  Negative for depressed mood. The patient is not nervous/anxious (since being treated with medication).        Vitals:    05/31/24 1008   BP: 124/82   BP Location: Left arm   Patient Position: Sitting   Cuff Size: Adult   Pulse: 72   Temp: 98 °F (36.7 °C)   SpO2: 99%   Weight: 90.7 kg (200 lb)   Height: 188 cm (74\")   PainSc: 0-No pain     Body mass index is 25.68 kg/m².      Physical Exam     Physical Exam  Constitutional:       General: He is not in acute distress.     Appearance: He is well-developed.   HENT:      Head: Normocephalic and atraumatic.      Comments: Trace middle ear effusion to the left tympanic membrane.     Right Ear: External ear " normal.      Left Ear: External ear normal.      Mouth/Throat:      Pharynx: No posterior oropharyngeal erythema.   Eyes:      Extraocular Movements: Extraocular movements intact.      Conjunctiva/sclera: Conjunctivae normal.   Cardiovascular:      Rate and Rhythm: Normal rate and regular rhythm.      Heart sounds: No murmur heard.  Pulmonary:      Effort: Pulmonary effort is normal. No respiratory distress.      Breath sounds: Normal breath sounds. No wheezing.   Abdominal:      General: Bowel sounds are normal. There is no distension.      Palpations: Abdomen is soft.      Tenderness: There is no abdominal tenderness.   Skin:     General: Skin is warm and dry.   Neurological:      Mental Status: He is alert and oriented to person, place, and time.      Cranial Nerves: No cranial nerve deficit.   Psychiatric:         Mood and Affect: Mood normal.         Behavior: Behavior normal.         Assessment/Plan    Diagnoses and all orders for this visit:    1. Anxiety (Primary)  Assessment & Plan:  His condition has improved with Wellbutrin. He will continue with current medication. However, he will gradually taper off of Lexapro. He was advised to take Lexapro 10 mg tablet daily for a period of 2 weeks, followed by a reduction to 5 mg daily for 2 weeks, and then discontinue. However, the patient has been advised that if he should develop increased anxiety, he may restart Lexapro.      2. Hyperlipidemia, unspecified hyperlipidemia type  Assessment & Plan:  The patient will continue TriCor. He was encouraged to complete a lipid panel in 3 months.    Orders:  -     Lipid Panel    3. Umbilical hernia without obstruction and without gangrene  Assessment & Plan:  Umbilical hernia repair and recovery were discussed. The patient will notify me if he wishes to proceed with general surgery referral.      Other orders  -     escitalopram (LEXAPRO) 10 MG tablet; Take 1 tablet by mouth every night at bedtime.  Dispense: 30 tablet;  Refill: 0  -     buPROPion XL (Wellbutrin XL) 150 MG 24 hr tablet; Take 1 tablet by mouth Daily.  Dispense: 90 tablet; Refill: 3          New Medications Ordered This Visit   Medications    escitalopram (LEXAPRO) 10 MG tablet     Sig: Take 1 tablet by mouth every night at bedtime.     Dispense:  30 tablet     Refill:  0    buPROPion XL (Wellbutrin XL) 150 MG 24 hr tablet     Sig: Take 1 tablet by mouth Daily.     Dispense:  90 tablet     Refill:  3       No orders of the defined types were placed in this encounter.      Return for Annual.    Transcribed from ambient dictation for Zhanna Hurley DO by Dalila Polk.  05/31/24   12:46 EDT    Patient or patient representative verbalized consent to the visit recording.  I have personally performed the services described in this document as transcribed by the above individual, and it is both accurate and complete.  Zhanna Hurley DO  5/31/2024  17:15 EDT

## 2024-06-03 ENCOUNTER — PREP FOR SURGERY (OUTPATIENT)
Dept: OTHER | Facility: HOSPITAL | Age: 44
End: 2024-06-03
Payer: COMMERCIAL

## 2024-06-03 DIAGNOSIS — Z12.11 SCREENING FOR COLON CANCER: Primary | ICD-10-CM

## 2024-06-06 PROBLEM — Z12.11 SCREENING FOR COLON CANCER: Status: ACTIVE | Noted: 2024-06-03

## 2024-06-28 ENCOUNTER — HOSPITAL ENCOUNTER (OUTPATIENT)
Facility: HOSPITAL | Age: 44
Setting detail: HOSPITAL OUTPATIENT SURGERY
Discharge: HOME | End: 2024-06-28
Attending: SURGERY | Admitting: SURGERY
Payer: COMMERCIAL

## 2024-06-28 ENCOUNTER — ANESTHESIA EVENT (OUTPATIENT)
Dept: GASTROENTEROLOGY | Facility: HOSPITAL | Age: 44
End: 2024-06-28
Payer: COMMERCIAL

## 2024-06-28 ENCOUNTER — ANESTHESIA (OUTPATIENT)
Dept: GASTROENTEROLOGY | Facility: HOSPITAL | Age: 44
End: 2024-06-28
Payer: COMMERCIAL

## 2024-06-28 VITALS
HEIGHT: 74 IN | HEART RATE: 62 BPM | WEIGHT: 200 LBS | DIASTOLIC BLOOD PRESSURE: 77 MMHG | OXYGEN SATURATION: 96 % | RESPIRATION RATE: 16 BRPM | SYSTOLIC BLOOD PRESSURE: 118 MMHG | TEMPERATURE: 97 F | BODY MASS INDEX: 25.67 KG/M2

## 2024-06-28 PROCEDURE — 25810000003 LACTATED RINGERS PER 1000 ML: Performed by: SURGERY

## 2024-06-28 PROCEDURE — S0260 H&P FOR SURGERY: HCPCS | Performed by: SURGERY

## 2024-06-28 PROCEDURE — 25010000002 MIDAZOLAM PER 1MG: Performed by: NURSE ANESTHETIST, CERTIFIED REGISTERED

## 2024-06-28 PROCEDURE — 25010000002 PROPOFOL 10 MG/ML EMULSION: Performed by: NURSE ANESTHETIST, CERTIFIED REGISTERED

## 2024-06-28 PROCEDURE — 45378 DIAGNOSTIC COLONOSCOPY: CPT | Performed by: SURGERY

## 2024-06-28 PROCEDURE — 25010000002 FENTANYL CITRATE (PF) 50 MCG/ML SOLUTION: Performed by: NURSE ANESTHETIST, CERTIFIED REGISTERED

## 2024-06-28 RX ORDER — KETAMINE HCL IN NACL, ISO-OSM 100MG/10ML
SYRINGE (ML) INJECTION AS NEEDED
Status: DISCONTINUED | OUTPATIENT
Start: 2024-06-28 | End: 2024-06-28 | Stop reason: SURG

## 2024-06-28 RX ORDER — ONDANSETRON 2 MG/ML
4 INJECTION INTRAMUSCULAR; INTRAVENOUS ONCE AS NEEDED
Status: DISCONTINUED | OUTPATIENT
Start: 2024-06-28 | End: 2024-06-28 | Stop reason: HOSPADM

## 2024-06-28 RX ORDER — MIDAZOLAM HYDROCHLORIDE 2 MG/2ML
INJECTION, SOLUTION INTRAMUSCULAR; INTRAVENOUS AS NEEDED
Status: DISCONTINUED | OUTPATIENT
Start: 2024-06-28 | End: 2024-06-28 | Stop reason: SURG

## 2024-06-28 RX ORDER — PROPOFOL 10 MG/ML
VIAL (ML) INTRAVENOUS AS NEEDED
Status: DISCONTINUED | OUTPATIENT
Start: 2024-06-28 | End: 2024-06-28 | Stop reason: SURG

## 2024-06-28 RX ORDER — SODIUM CHLORIDE, SODIUM LACTATE, POTASSIUM CHLORIDE, CALCIUM CHLORIDE 600; 310; 30; 20 MG/100ML; MG/100ML; MG/100ML; MG/100ML
1000 INJECTION, SOLUTION INTRAVENOUS CONTINUOUS
Status: DISCONTINUED | OUTPATIENT
Start: 2024-06-28 | End: 2024-06-28 | Stop reason: HOSPADM

## 2024-06-28 RX ORDER — FENTANYL CITRATE 50 UG/ML
INJECTION, SOLUTION INTRAMUSCULAR; INTRAVENOUS AS NEEDED
Status: DISCONTINUED | OUTPATIENT
Start: 2024-06-28 | End: 2024-06-28 | Stop reason: SURG

## 2024-06-28 RX ADMIN — FENTANYL CITRATE 50 MCG: 50 INJECTION, SOLUTION INTRAMUSCULAR; INTRAVENOUS at 09:02

## 2024-06-28 RX ADMIN — PROPOFOL 40 MG: 10 INJECTION, EMULSION INTRAVENOUS at 09:06

## 2024-06-28 RX ADMIN — PROPOFOL 50 MG: 10 INJECTION, EMULSION INTRAVENOUS at 09:10

## 2024-06-28 RX ADMIN — SODIUM CHLORIDE, POTASSIUM CHLORIDE, SODIUM LACTATE AND CALCIUM CHLORIDE: 600; 310; 30; 20 INJECTION, SOLUTION INTRAVENOUS at 08:48

## 2024-06-28 RX ADMIN — PROPOFOL 50 MG: 10 INJECTION, EMULSION INTRAVENOUS at 09:11

## 2024-06-28 RX ADMIN — MIDAZOLAM HYDROCHLORIDE 2 MG: 1 INJECTION, SOLUTION INTRAMUSCULAR; INTRAVENOUS at 09:01

## 2024-06-28 RX ADMIN — Medication 25 MG: at 09:10

## 2024-06-28 RX ADMIN — Medication 25 MG: at 09:02

## 2024-06-28 NOTE — H&P
[facility]   HISTORY AND PHYSICAL      Name:  Felice Dasilva   Age:  44 y.o.  Sex:  male  :  1980  MRN:  [id number]   Visit Number:  [id number]  Admission Date:  2024  Date Of Service:  24  Primary Care Physician:  Zhanna Hurley DO    Chief Complaint:     I need a colonoscopy    History Of Presenting Illness:      38-year-old male comes in for routine colonoscopy, no previous colonoscopy no symptoms.  No family history of colon cancer.    Review Of Systems:     General ROS: Patient denies any fevers, chills or loss of consciousness.  No complaints of generalized weakness  Psychological ROS: Denies any hallucinations and delusions.  Respiratory ROS: Denies cough or shortness of breath.   Cardiovascular ROS: Denies chest pain or palpitations. No history of exertional chest pain.   Gastrointestinal ROS: Denies nausea and vomiting. Denies any abdominal pain. No diarrhea.   Genito-Urinary ROS: Denies dysuria or hematuria.  Neurological ROS: Denies any focal weakness. No loss of consciousness. Denies any numbness.   Dermatological ROS: Denies any redness or pruritis.     Past Medical History:    Past Medical History:   Diagnosis Date    Allergic rhinitis     Anxiety     Hyperlipidemia        Past Surgical history:    Past Surgical History:   Procedure Laterality Date    WISDOM TOOTH EXTRACTION         Social History:    Social History     Socioeconomic History    Marital status:    Tobacco Use    Smoking status: Never     Passive exposure: Never    Smokeless tobacco: Never   Vaping Use    Vaping status: Never Used   Substance and Sexual Activity    Alcohol use: Yes     Alcohol/week: 1.0 standard drink of alcohol     Types: 1 Cans of beer per week     Comment: Occasional beer once weekly socially    Drug use: No    Sexual activity: Yes     Partners: Female     Birth control/protection: None       Family History:    Family History   Problem Relation Age of  Onset    High cholesterol Mother     Dementia Mother     High cholesterol Father     Cancer Maternal Grandmother         melanoma    Diabetes Maternal Grandfather     Stroke Paternal Grandmother        Allergies:      Penicillins and Sulfa antibiotics    Home Medications:    Prior to Admission Medications       Prescriptions Last Dose Informant Patient Reported? Taking?    bisacodyl 5 MG EC tablet   No No    Take 4 tablets at 1:00pm with 8oz of clear liquids the day before your colonoscopy.    Patient not taking:  Reported on 5/31/2024    buPROPion XL (Wellbutrin XL) 150 MG 24 hr tablet 6/27/2024  No Yes    Take 1 tablet by mouth Daily.    escitalopram (LEXAPRO) 10 MG tablet 6/27/2024  No Yes    Take 1 tablet by mouth every night at bedtime.    Patient taking differently:  Take 0.5 tablets by mouth every night at bedtime.    fenofibrate (Tricor) 145 MG tablet 6/27/2024  No Yes    Take 1 tablet by mouth Daily.    Multiple Vitamin (MULTI-VITAMIN DAILY PO) Past Week  Yes Yes    Take  by mouth.    polyethylene glycol (MIRALAX) 17 GM/SCOOP powder   No No    Mix 238g powder with 64 oz of clear liquid at 4:00pm. Drink 8 oz every 10-15 minutes until consumed.    Patient not taking:  Reported on 5/31/2024               ED Medications:    Medications   lactated ringers infusion 1,000 mL ( Intravenous Currently Infusing 6/28/24 0900)       Vital Signs:    Temp:  [97.6 °F (36.4 °C)] 97.6 °F (36.4 °C)  Heart Rate:  [92] 92  Resp:  [16] 16  BP: (109)/(80) 109/80        06/27/24  1720   Weight: 90.7 kg (200 lb)       Body mass index is 25.68 kg/m².    Physical Exam:      General Appearance:  Alert and cooperative, not in any acute distress.   Head:  Atraumatic and normocephalic, without obvious abnormality.   Eyes:          PERRLA, conjunctivae and sclerae normal, no Icterus. No pallor. Extra-occular movements are within normal limits.   Ears:  Ears appear intact with no abnormalities noted.   Respiratory/Lungs:   Breath sounds  heard bilaterally equally.  No crackles or wheezing. No Pleural rub or bronchial breathing. Normal respiratory effort.    Cardiovascular/Heart:  Normal S1 and S2,    GI/Abdomen:   No masses, no hepatosplenomegaly. Soft, non-tender, non-distended, no hernia                 Musculoskeletal/ Extremities:   Moves all extremities well   Skin: No bleeding, bruising or rash, no induration   Psychiatric : Alert and oriented ×3.  No depression or anxiety    Neurologic: Cranial nerves 2 - 12 grossly intact, sensation intact, Motor power is normal and equal bilaterally.       EKG:          Labs:    Lab Results (last 24 hours)       ** No results found for the last 24 hours. **            Radiology:    Imaging Results (Last 72 Hours)       ** No results found for the last 72 hours. **            Assessment:    Screening colonoscopy     Plan:     I recommend a screening colonoscopy to the patient.  The patient understands the procedure and the reason for the procedure.  The patient also understands the risks which include but are not limited to bleeding and perforation and they understand the ramifications of these potential complications including operative intervention and wish to proceed.    Иван Johnson MD  06/28/24  09:02 EDT

## 2024-06-28 NOTE — ANESTHESIA POSTPROCEDURE EVALUATION
Patient: Felice Dasilva    Procedure Summary       Date: 06/28/24 Room / Location: [facility] END[facility]COPY 3 / [facility]COPY    Anesthesia Start: 0900 Anesthesia Stop:     Procedure: COLONOSCOPY Diagnosis:       Screening for colon cancer      (Screening for colon cancer [Z12.11])    Surgeons: Иван Johnson MD Provider: Mauro Patricio CRNA    Anesthesia Type: MAC ASA Status: 3            Anesthesia Type: MAC    Vitals  No vitals data found for the desired time range.          Post Anesthesia Care and Evaluation    Patient location during evaluation: PHASE II  Patient participation: complete - patient participated  Level of consciousness: awake  Pain score: 0  Pain management: adequate    Airway patency: patent  Anesthetic complications: No anesthetic complications  PONV Status: none  Cardiovascular status: acceptable  Respiratory status: acceptable, nasal cannula and spontaneous ventilation  Hydration status: acceptable    Comments: See R.N. note for postop vital signs.vsss resp spont, reflexes intact, responsive, report given to pacu nurse

## 2024-06-28 NOTE — ANESTHESIA PREPROCEDURE EVALUATION
Anesthesia Evaluation     Patient summary reviewed and Nursing notes reviewed   NPO Solid Status: > 8 hours  NPO Liquid Status: > 8 hours           Airway   Mallampati: II  TM distance: >3 FB  Neck ROM: full  Possible difficult intubation and No difficulty expected  Dental      Pulmonary    Cardiovascular     (+) hyperlipidemia      Neuro/Psych  (+) psychiatric history Anxiety and Depression  GI/Hepatic/Renal/Endo      Musculoskeletal     Abdominal    Substance History      OB/GYN          Other                    Anesthesia Plan    ASA 3     MAC     (Risks and benefits discussed including risk of aspiration, recall and dental damage. All patient questions answered.    Will continue with plan of care.)  intravenous induction     Anesthetic plan, risks, benefits, and alternatives have been provided, discussed and informed consent has been obtained with: patient.  Pre-procedure education provided    CODE STATUS:

## 2024-07-23 ENCOUNTER — PATIENT MESSAGE (OUTPATIENT)
Dept: INTERNAL MEDICINE | Facility: CLINIC | Age: 44
End: 2024-07-23
Payer: COMMERCIAL

## 2024-07-23 RX ORDER — BUPROPION HYDROCHLORIDE 300 MG/1
300 TABLET ORAL DAILY
Qty: 90 TABLET | Refills: 3 | Status: SHIPPED | OUTPATIENT
Start: 2024-07-23

## 2024-07-23 NOTE — TELEPHONE ENCOUNTER
From: Felice Dasilva  To: Zhanna Hurley  Sent: 7/23/2024 11:13 AM EDT  Subject: Lexapro/wellbutrin    Good morning. Last message you told me to start taking lexapro with the Wellbutrin but I didn’t because my libido came back after ending the lexapro. My anxiety/depression isn’t improving and I was wondering if a higher dosage of the Wellbutrin would be a better choice in my case or is the lexapro/wellbutrin combo more effective? Either way, I need to do something   Thank you  Tj

## 2025-07-03 ENCOUNTER — OFFICE VISIT (OUTPATIENT)
Dept: INTERNAL MEDICINE | Facility: CLINIC | Age: 45
End: 2025-07-03
Payer: COMMERCIAL

## 2025-07-03 VITALS
BODY MASS INDEX: 25.67 KG/M2 | WEIGHT: 200 LBS | HEART RATE: 80 BPM | TEMPERATURE: 97 F | DIASTOLIC BLOOD PRESSURE: 76 MMHG | SYSTOLIC BLOOD PRESSURE: 118 MMHG | OXYGEN SATURATION: 100 % | HEIGHT: 74 IN

## 2025-07-03 DIAGNOSIS — F41.9 ANXIETY: ICD-10-CM

## 2025-07-03 DIAGNOSIS — L70.9 ACNE, UNSPECIFIED ACNE TYPE: ICD-10-CM

## 2025-07-03 DIAGNOSIS — E34.9 TESTOSTERONE DEFICIENCY: ICD-10-CM

## 2025-07-03 DIAGNOSIS — Z13.29 SCREENING FOR ENDOCRINE DISORDER: ICD-10-CM

## 2025-07-03 DIAGNOSIS — E78.5 HYPERLIPIDEMIA, UNSPECIFIED HYPERLIPIDEMIA TYPE: ICD-10-CM

## 2025-07-03 DIAGNOSIS — Z13.0 SCREENING FOR BLOOD DISEASE: ICD-10-CM

## 2025-07-03 DIAGNOSIS — Z00.00 WELL ADULT EXAM: Primary | ICD-10-CM

## 2025-07-03 RX ORDER — CLINDAMYCIN PHOSPHATE 10 MG/G
1 AEROSOL, FOAM TOPICAL DAILY
Qty: 100 G | Refills: 5 | Status: SHIPPED | OUTPATIENT
Start: 2025-07-03

## 2025-07-03 NOTE — PROGRESS NOTES
Your Child's Health  18-Month-Old Visit      Re Hansen  June 12, 2023    Visit Vitals  Ht 37\" (94 cm)   Wt (!) 14.7 kg (32 lb 6.5 oz)   HC 49.4 cm (19.45\")   BMI 16.64 kg/m²     Weight:       YOUR CHILD’S 18 MONTH OLD VISIT       Key points at this age…  Re should continue to ride in a properly-installed car seat in the back seat. Correct use of a car seat is always critically important for your child’s safety. For maximum safety, keep the seat rear facing until your child reaches the top height or weight limit allowed by the car seat .        Help your child’s language develop by talking to them with clear, simple words and reading lots of books together. Electronic media (TV, pads, phones, computers) are not recommended at this age.     Take care of those teeth! Help your child brush twice daily with a tiny amount of regular (not baby) toothpaste. Avoid sugary and sticky foods and sweetened drinks like juice and soda. If you give your child juice, limit it to no more than 4 ounces a day of 100% juice.      Nutrition & Healthy Weight:   Healthy eating is a challenge for everyone, especially for busy parents of toddlers who can often be picky and hard to please! But it is important-- nearly 1 in 3 children in our country is overweight or obese which poses serious health risks for them as they get older.      Here are some things to think about at this age:   Water and milk are the healthiest drink choices for your children.      Avoid junk food and sweet things-- fried fast food, bagged snacks, candy, fruit snacks, sugary cereals, juice, Arnav-Aid, etc. Once your child develops a “sweet tooth” for these things, they will always be asking for them. Too much sugar is bad for their teeth and their overall nutrition and weight. Children this age should have no more than 4 ounces of 100% fruit juice daily. Do not water it down in a bottle or sippy cup that they can carry around and drink from over an  Felice Dasilva is a 45 y.o. male.    Chief Complaint   Patient presents with    Annual Exam       HPI   History of Present Illness  The patient presents for an annual physical exam.    He has been managing anxiety well, attributing symptoms to low testosterone. He discontinued Wellbutrin and other antidepressants and anxiety medications, feeling well overall without them. Lexapro was effective for years but stopped working, leading to a switch to Wellbutrin and other medications. He reports no chest pain, shortness of breath, gastrointestinal issues, urinary problems, or body aches. Occasional headaches are believed to be allergy-related.     Blood work revealed low testosterone. He received a testosterone booster from [facility] in [place] 2 months ago. Last blood work was 6 months ago, including cholesterol check. He believes testosterone treatment has been beneficial.    He noticed skin irritation on his chest, possibly due to testosterone treatment. He uses body wash and lotion for dry skin.    He discontinued Tricor for cholesterol and triglycerides 6 months ago without side effects. He maintains a healthy diet and exercises regularly, including daily walks and strength training with dumbbells 3-4 days a week. He is up to date on dental and eye exams. Last year's colonoscopy showed hemorrhoids but no other issues. He does not usually get a flu shot. He had a few COVID-19 vaccines.  Tdap up-to-date, received last year.    The following portions of the patient's history were reviewed and updated as appropriate: allergies, current medications, past family history, past medical history, past social history, past surgical history and problem list.     Past Medical History:   Diagnosis Date    Allergic rhinitis     Anxiety     Hyperlipidemia        Past Surgical History:   Procedure Laterality Date    COLONOSCOPY N/A 6/28/2024    Procedure: COLONOSCOPY;  Surgeon: Иван Johnson MD;   Location: [facility] ENDOSCOPY;  Service: Gastroenterology;  Laterality: N/A;    WISDOM TOOTH EXTRACTION         Family History   Problem Relation Age of Onset    High cholesterol Mother     Dementia Mother     High cholesterol Father     Cancer Maternal Grandmother         melanoma    Diabetes Maternal Grandfather     Stroke Paternal Grandmother        Social History     Socioeconomic History    Marital status:    Tobacco Use    Smoking status: Never     Passive exposure: Never    Smokeless tobacco: Never   Vaping Use    Vaping status: Never Used   Substance and Sexual Activity    Alcohol use: Yes     Alcohol/week: 1.0 standard drink of alcohol     Types: 1 Cans of beer per week     Comment: Occasional beer once weekly socially    Drug use: No    Sexual activity: Yes     Partners: Female     Birth control/protection: None, Vasectomy       Allergies   Allergen Reactions    Penicillins Rash     Pt can tolerate       Sulfa Antibiotics Rash     Pt can tolerate            Current Outpatient Medications:     Multiple Vitamin (MULTI-VITAMIN DAILY PO), Take  by mouth., Disp: , Rfl:     Clindamycin Phosphate 1 % foam, Apply 1 Application topically to the appropriate area as directed Daily., Disp: 100 g, Rfl: 5    ROS    Review of Systems   Constitutional:  Negative for chills, fatigue and fever.   HENT:  Negative for congestion, postnasal drip and sore throat.    Eyes:  Negative for visual disturbance.   Respiratory:  Negative for cough and shortness of breath.    Cardiovascular:  Negative for chest pain.   Gastrointestinal:  Negative for abdominal pain, constipation, diarrhea, nausea and vomiting.   Endocrine: Negative for cold intolerance and heat intolerance.   Genitourinary:  Negative for difficulty urinating.   Musculoskeletal:  Negative for arthralgias and back pain.   Skin:  Positive for rash (acne).   Allergic/Immunologic: Negative for environmental allergies.   Neurological:  Negative for headache.  extended period of time; this frequent exposure to the sugars in juice (even if diluted with water) just increases their risk of tooth decay.       Don’t push your children to eat when they do not want to. Offer them small portions at a time; they can have seconds when they eat everything they were offered. Serving large portions may result in eating more than they need (and waste!).      Toddlers often resist new foods, but keep trying! Most will eventually try something new after it is offered several times. (Don’t get into the habit of serving only what they like!)    Even when eating a very well balanced diet, children need some extra vitamin D. A liquid preparation of pure vitamin D (400 or 600 IU) or a multivitamin with iron drop is recommended.  (They are currently too young for a chewable vitamin because they could choke on those.)     DENTAL CARE:   By now you should be brushing your child’s teeth twice daily, especially at bedtime. (It’s okay if they think you are “just helping” them!) Brush with a tiny smear of regular (fluoridated) toothpaste (not baby toothpaste).  Using city water to drink and cook with provides important fluoride to strengthen and protect teeth against cavities. If you have well water instead of a city water supply, however, you should have it tested for fluoride content to determine whether your child might need fluoride supplements.     DEVELOPMENT & BEHAVIOR:  Toddlers will be learning lots of new skills at this age and over the next several months! They should be walking well and often running; most will try to climb stairs (if you let them!). They may help get themselves undressed, scribble, and use a cup and spoon fairly well. They should have at least 6 words, will gesture and point to show someone what they want. They will “pretend” their dolls or stuffed animals are real, and they will hopefully know the name of their favorite book. Over the next several months, help them  "  Hematological:  Does not bruise/bleed easily.   Psychiatric/Behavioral:  Negative for depressed mood. The patient is not nervous/anxious.        Vitals:    07/03/25 0935   BP: 118/76   Pulse: 80   Temp: 97 °F (36.1 °C)   SpO2: 100%   Weight: 90.7 kg (200 lb)   Height: 188 cm (74\")   PainSc: 0-No pain     Body mass index is 25.68 kg/m².    Physical Exam     Physical Exam  Constitutional:       General: He is not in acute distress.     Appearance: Normal appearance. He is well-developed.   HENT:      Head: Normocephalic and atraumatic.      Right Ear: Tympanic membrane and external ear normal.      Left Ear: Tympanic membrane and external ear normal.      Mouth/Throat:      Pharynx: No posterior oropharyngeal erythema.   Eyes:      Extraocular Movements: Extraocular movements intact.      Conjunctiva/sclera: Conjunctivae normal.      Pupils: Pupils are equal, round, and reactive to light.   Cardiovascular:      Rate and Rhythm: Normal rate and regular rhythm.      Heart sounds: No murmur heard.  Pulmonary:      Effort: Pulmonary effort is normal. No respiratory distress.      Breath sounds: Normal breath sounds. No wheezing.   Abdominal:      General: Bowel sounds are normal. There is no distension.      Palpations: Abdomen is soft.      Tenderness: There is no abdominal tenderness.   Musculoskeletal:      Cervical back: Neck supple.      Right lower leg: No edema.      Left lower leg: No edema.   Lymphadenopathy:      Cervical: No cervical adenopathy.   Skin:     General: Skin is warm and dry.      Comments: Acne noted to chest and abdomen   Neurological:      Mental Status: He is alert and oriented to person, place, and time.      Cranial Nerves: No cranial nerve deficit.   Psychiatric:         Mood and Affect: Mood normal.         Behavior: Behavior normal.           Diagnoses and all orders for this visit:    1. Well adult exam (Primary)    2. Testosterone deficiency  -     Testosterone, Free, Total    3. " learn by pointing to and naming pictures in books as well as their own body parts. Talking, singing and reading together are great ways to advance your baby’s language skills!      The American Academy of Pediatrics (AAP) does not recommend any “screen time” (TV, videos, pads, phones) except for video chatting before age 18 months. If parents of children between 18 months and 2 years want to start some screen time, they should choose high-quality programming (the AAP suggests work created by [employer] or [employer]).  Watching should be limited to very brief time periods (minutes, not hours), and parents should watch these programs with their child to help children understand what they are seeing--children should not be given devices to watch on their own.     Most children are not developmentally ready for potty-training until closer to 3 years old. If your child seems interested, it is fine to get them a potty chair to use (and provide lots of praise when they use it!). Just remember their young body is not likely to be developed enough at this age for them to be fully potty trained at this time.       TEMPER TANTRUMS:  These begin about the time a child starts walking and can continue until they are 3½ years old (or older). Toddlers tantrum because they are frustrated that they can’t say what they want or need or they are angry because they cannot get or have something they want.  The most effective method of dealing with tantrums is to ignore them and wait for it to pass. Once your child is calm, simply direct them to a new activity. Don’t try to “discuss” their tantrum (they don’t get that!) and certainly do not promise them a reward for stopping their crying. Having (and enforcing) consistent rules and giving  your child a lot of positive attention when they are behaving well (“time in”) will promote good behavior and reduce tantrums. When your child is misbehaving (hitting, biting), brief time-outs (which  Anxiety    4. Acne, unspecified acne type    5. Hyperlipidemia, unspecified hyperlipidemia type  -     Lipid Panel    6. Screening for blood disease  -     CBC & Differential    7. Screening for endocrine disorder  -     Comprehensive Metabolic Panel    Other orders  -     Clindamycin Phosphate 1 % foam; Apply 1 Application topically to the appropriate area as directed Daily.  Dispense: 100 g; Refill: 5        Assessment & Plan  1. Well adult exam.  - Discussed routine health maintenance, including vaccinations, dental and eye health, balanced diet, and regular exercise.  - Addressed colorectal cancer screening and mental health.  - Ordered routine labs.    2. Testosterone deficiency.  - Will obtain updated testosterone level.  - Informed about testosterone pellets available through urology with insurance coverage.  - Option to schedule appointment with Dr. Groves.    3. Anxiety.  - Condition stable without medication.    4. Acne.  - Prescribed clindamycin foam, to be used once daily in the shower.      Follow-up  Follow up in 1 year or sooner if needed.    New Medications Ordered This Visit   Medications    Clindamycin Phosphate 1 % foam     Sig: Apply 1 Application topically to the appropriate area as directed Daily.     Dispense:  100 g     Refill:  5       No orders of the defined types were placed in this encounter.      Return in about 1 year (around 7/3/2026) for Annual.      Zhanna Hurley DO   essentially means ignoring your child for a brief period) work because your child wants your attention more than anything else.     CAR SAFETY: An approved car seat in the back seat is required by [place] law. Your child is safest in a rear-facing convertible car seat right now: keep the seat in a rear-facing position until your child reaches the top height or weight limit allowed by the car seat . (Really! Even if your child is tall and they have to keep their legs bent, they are still safer when rear facing. And kids generally do not mind having their legs bunched up-- that really seems to bother parents more than it bothers kids!)     SAFETY & ACCIDENT PREVENTION:  By this age, your home should be pretty well safety-proofed. Here are some reminders about possible safety risks for curious, active toddlers!    1. Burns:  Keep dangerous items out of reach, including hot liquids, hot foods, and electrical cords on appliances such as irons, toasters, and coffee pots--children can pull on cords and suffer serious burns if they pull something hot down onto themselves. Have a family fire safety plan, including regular smoke detector (and carbon monoxide detector) battery checks, working fire extinguishers, and escape routes.  2. Falls: Keep webber on stairs and window latches on upper-story windows.  3. Water Safety:  Children can drown in just a couple inches of water, so never leave a toddler alone in a tub. Also, do not rely on older children to properly supervise the younger ones. Children this young are not developmentally ready for swimming lessons, and life jackets and “swimmies” will NOT protect your child from drowning! When near water, children need constant supervision by an adult who knows how to swim. Permanent pools (in ground and above ground) should be fenced off (and locked), and wading pools should be drained when not in use. Keep large buckets empty and keep toilet seat lids down and  secured with a safety lock if possible.   4.  Poisoning:  Continue to keep all cleaning and chemical products safely stored out of sight and out of reach. (Check for what is under your bathroom sink as well as your kitchen sink.) Keep purses (your own and ones belonging to visitors) out of reach since curious toddlers can quickly find medicines, cigarettes, and small objects to choke on! Keep the original bottles and safety caps for all medicines, including vitamins; do not transfer medicines to non-child-proofed or unlabeled containers. Be especially careful when around older people because they may keep their medicines in clear sight or in non-childproofed containers.   Is this number in your cell phone? Call the Poison Center at 1-655.980.5561 for any known or suspected poisoning.      SMOKING:  Continue to protect your child from cigarette smoke. Exposure to smoke will increase their risk of asthma, ear infections, and respiratory infections (pneumonia). If you smoke and are ready to consider quitting, talk to your doctor. Nicotine replacement products can be very helpful in breaking this tough addiction.     LEAD EXPOSURE:    If you live in [place], your child should be screened for lead now if they were not checked at age 12 months. The [facility] Department recommends screening children three times in the first 3 years of life. If you do not live in [place], talk to your doctor about lead screening if any of the following apply: (1) your child currently (or had previously) lives in or frequently visits a house or building built before 1950 (including , grandparent homes, etc); (2) your child currently (or had previously) lives in or frequently visits a house or building built before 1978 with recent or ongoing renovations; (3) your child has a brother, sister or playmate who has had lead poisoning; (4) your child is enrolled in Medicaid or WIC. Very rarely, other sources of lead exposure  can include herbal remedies or imported items (mini blinds, canned goods). Do an internet search for “Critical access hospital Department Lead” for helpful information about lead risks in [place]. If you have questions about older neighborhoods in [place] that might have lead connecting (or service) pipes, do an internet search for \"[place] lead awareness and drinking water safety\". From this web page, you can search for your address to find out if your home was built with lead service lines. There are also helpful hints regarding water safety on this site.     SUN EXPOSURE:  Keep your child in shade and out of direct sun as much as you can. Protective clothing as well as hats and sunglasses help protect against sunburn and skin damage. When your child is going to be outside, always use a “broad spectrum” sunscreen (which means it protects against both UVA and UVB rays) with an SPF of 15 to 30; apply it to your child’s skin at least 20 to 30 minutes before they go out. Kids this age often like the zinc oxide (or titanium dioxide) products which are good for sensitive spots (nose, cheeks, ears, shoulders); these don’t “rub in” all the way, but kids often like the fun colors they come in!      MEDICATION FOR FEVER OR PAIN:   Acetaminophen liquid (e.g., Tylenol or Tempra) may be given every four hours as needed for pain or fever.  Acetaminophen liquid is less concentrated than the infant dropper bottle type.  Be sure to check which product CONCENTRATION you are using.    INFANT Tylenol/Acetaminophen  Drops (160 mg/5 mL)    Child’s Weight: Dose:  24 - 35 pounds:   160 mg (5.0 mL (1 Teaspoon))    CHILDREN’S Tylenol/Acetaminophen  (160 mg/5 mL)    Child’s Weight: Dose:  24 - 35 pounds:   160 mg (5.0 mL (1 Teaspoon))    CHILDREN'S Ibuprofen (100 mg/5 mL) liquid (for example, Advil or Motrin) may be given every 6 hours as needed for pain or fever.    Child’s Weight: Dose:  24 - 35 pounds:   100 mg (5.0 mL(1Teaspoon))    If  Re is outside this weight range, call your pediatrician's office for advice      Most Recent Immunizations   Administered Date(s) Administered    DTaP/Hep B/IPV 03/16/2022    Hep A, ped/adol, 2 dose 12/08/2022    Hep B, adolescent or pediatric 2021    Hib (PRP-OMP) 01/19/2022    Influenza, quadrivalent, preserve-free 12/08/2022    MMR 12/08/2022    Pneumococcal Conjugate 13 Valent Vacc (Prevnar 13) 03/16/2022    Rotavirus - pentavalent 03/16/2022    Varicella 12/08/2022       If Re develops any of the following reactions within 72 hours after an immunization, notify your pediatrician by calling the pediatric phone nurse:  A temperature of 105 degrees or above.  More than 3 hours of continuous crying.  A shrill, high-pitched cry.  A pale, limp spell.  A seizure or fainting spell. In this case, you should call 911 or go immediately to the emergency room.    NEXT VISIT:  2 YEARS OF AGE    Thank you for entrusting your care to [facility].      Also, check out “Children’s Health” on the [facility] Blog for updates on timely topics regarding children’s health!

## 2025-07-03 NOTE — Clinical Note
[facility]  Vaccine Consent Form    Patient Name:  Felice Dasilva  Patient :  1980        Screening Checklist  The following questions should be completed prior to vaccination. If you answer “yes” to any question, it does not necessarily mean you should not be vaccinated. It just means we may need to clarify or ask more questions. If a question is unclear, please ask your healthcare provider to explain it.    Yes No   Any fever or moderate to severe illness today (mild illness and/or antibiotic treatment are not contraindications)?     Do you have a history of a serious reaction to any previous vaccinations, such as anaphylaxis, encephalopathy within 7 days, Guillain-[place]ial[place] syndrome within 6 weeks, seizure?     Have you received any live vaccine(s) (e.g MMR, DORA) or any other vaccines in the last month (to ensure duplicate doses aren't given)?     Do you have an anaphylactic allergy to latex (DTaP, DTaP-IPV, Hep A, Hep B, MenB, RV, Td, Tdap), baker’s yeast (Hep B, HPV), polysorbates (RSV, nirsevimab, PCV 20 and 21, Rotavirrus, Tdap, Shingrix), or gelatin (DORA, MMR)?     Do you have an anaphylactic allergy to neomycin (Rabies, DORA, MMR, IPV, Hep A), polymyxin B (IPV), or streptomycin (IPV)?      Any cancer, leukemia, AIDS, or other immune system disorder? (DORA, MMR, RV)     Do you have a parent, brother, or sister with an immune system problem (if immune competence of vaccine recipient clinically verified, can proceed)? (MMR, DORA)     Any recent steroid treatments for >2 weeks, chemotherapy, or radiation treatment? (DORA, MMR)     Have you received antibody-containing blood transfusions or IVIG in the past 11 months (recommended interval is dependent on product)? (MMR, DORA)     Have you taken antiviral drugs (acyclovir, famciclovir, valacyclovir for DORA) in the last 24 or 48 hours, respectively?      Are you pregnant or planning to become pregnant within 1 month? (DORA, MMR, HPV, IPV, MenB, Abrexvy; For  "Hep B- refer to Engerix-B; For RSV - Abrysvo is indicated for 32-36 weeks of pregnancy from September to January)     For infants, have you ever been told your child has had intussusception or a medical emergency involving obstruction of the intestine (Rotavirus)? If not for an infant, can skip this question.         *Ordering Physicians/APC should be consulted if \"yes\" is checked by the patient or guardian above.  I have received, read, and understand the Vaccine Information Statement (VIS) for each vaccine ordered.  I have considered my or my child's health status as well as the health status of my close contacts.  I have taken the opportunity to discuss my vaccine questions with my or my child's health care provider.   I have requested that the ordered vaccine(s) be given to me or my child.  I understand the benefits and risks of the vaccines.  I understand that I should remain in the clinic for 15 minutes after receiving the vaccine(s).  _________________________________________________________  Signature of Patient or Parent/Legal Guardian ____________________  Date     "

## (undated) DEVICE — GLV SURG SENSICARE W/ALOE PF LF 7.5 STRL

## (undated) DEVICE — VLV SXN AIR/H2O ORCAPOD3 1P/U STRL

## (undated) DEVICE — HYBRID CO2 TUBING/CAP SET FOR OLYMPUS® SCOPES & CO2 SOURCE: Brand: ERBE

## (undated) DEVICE — PATIENT RETURN ELECTRODE, SINGLE-USE, CONTACT QUALITY MONITORING, ADULT, WITH 9FT CORD, FOR PATIENTS WEIGING OVER 33LBS. (15KG): Brand: MEGADYNE

## (undated) DEVICE — ENDOSCOPY PORT CONNECTOR FOR OLYMPUS® SCOPES: Brand: ERBE

## (undated) DEVICE — LUBE JELLY PK/2.75GM STRL BX/144

## (undated) DEVICE — Device

## (undated) DEVICE — MEDI-VAC NON-CONDUCTIVE SUCTION TUBING: Brand: CARDINAL HEALTH